# Patient Record
Sex: FEMALE | Race: WHITE
[De-identification: names, ages, dates, MRNs, and addresses within clinical notes are randomized per-mention and may not be internally consistent; named-entity substitution may affect disease eponyms.]

---

## 2019-06-18 ENCOUNTER — HOSPITAL ENCOUNTER (OUTPATIENT)
Dept: HOSPITAL 75 - RAD | Age: 19
End: 2019-06-18
Attending: NURSE PRACTITIONER
Payer: COMMERCIAL

## 2019-06-18 DIAGNOSIS — N63.13: Primary | ICD-10-CM

## 2019-06-18 PROCEDURE — 76641 ULTRASOUND BREAST COMPLETE: CPT

## 2019-06-18 NOTE — DIAGNOSTIC IMAGING REPORT
INDICATION: 

Lump in the lower outer quadrant of the right breast.



TECHNIQUE: 

Sonographic interrogation of the area of lump in the right breast

was performed. In addition, the retroareolar region and all 4

quadrants of the right breast were evaluated.



FINDINGS:

No sonographic abnormality is seen. No solid or cystic mass is

detected.



IMPRESSION: 

No sonographic abnormality is detected. Continued close clinical

and self breast exams are recommend to confirm stability of the

palpable abnormality.



ACR BI-RADS Category 1: Negative.



Dictated by: 



  Dictated on workstation # [id number]

## 2021-06-29 ENCOUNTER — HOSPITAL ENCOUNTER (OUTPATIENT)
Dept: HOSPITAL 75 - RAD | Age: 21
End: 2021-06-29
Attending: OBSTETRICS & GYNECOLOGY
Payer: COMMERCIAL

## 2021-06-29 DIAGNOSIS — N91.5: Primary | ICD-10-CM

## 2021-06-29 PROCEDURE — 76830 TRANSVAGINAL US NON-OB: CPT

## 2021-06-29 PROCEDURE — 76856 US EXAM PELVIC COMPLETE: CPT

## 2021-06-29 NOTE — DIAGNOSTIC IMAGING REPORT
PROCEDURE: US Non-ob pelvis comp/trans.



TECHNIQUE:  Multiple realtime grayscale images were obtained of

the pelvis in various projections endovaginally.



Transabdominal imaging was also performed.



INDICATION:  Oligomenorrhea.



COMPARISON: CT abdomen and pelvis from 07/26/2019.



FINDINGS: The uterus measures 6.6 x 3.4 x 4.5 cm. There is no

myometrial mass. The endometrium measures 0.6 cm in thickness.



The right ovary measures 3.75 x 2.84 x 2.93 cm giving a volume of

16.34 mL. There are numerous peripheral subcentimeter anechoic

follicles throughout the right ovary.



The left ovary measures 3.52 x 2.16 x 3.23 cm for a volume of

12.86 mL. There are numerous peripheral anechoic follicles all

measuring less than 1 cm in size.



No free pelvic fluid.



IMPRESSION:

1. Sonographic features meet criteria for polycystic ovarian

syndrome.



Dictated by: 



  Dictated on workstation # [id number]